# Patient Record
Sex: MALE | Race: WHITE
[De-identification: names, ages, dates, MRNs, and addresses within clinical notes are randomized per-mention and may not be internally consistent; named-entity substitution may affect disease eponyms.]

---

## 2017-07-25 ENCOUNTER — HOSPITAL ENCOUNTER (INPATIENT)
Dept: HOSPITAL 53 - M ED | Age: 37
LOS: 20 days | Discharge: HOME | DRG: 751 | End: 2017-08-14
Attending: PSYCHIATRY & NEUROLOGY | Admitting: PSYCHIATRY & NEUROLOGY
Payer: SELF-PAY

## 2017-07-25 VITALS — BODY MASS INDEX: 27.69 KG/M2 | HEIGHT: 75 IN | WEIGHT: 222.67 LBS

## 2017-07-25 VITALS — SYSTOLIC BLOOD PRESSURE: 140 MMHG | DIASTOLIC BLOOD PRESSURE: 92 MMHG

## 2017-07-25 DIAGNOSIS — R45.851: ICD-10-CM

## 2017-07-25 DIAGNOSIS — F19.90: ICD-10-CM

## 2017-07-25 DIAGNOSIS — F10.10: ICD-10-CM

## 2017-07-25 DIAGNOSIS — F33.3: Primary | ICD-10-CM

## 2017-07-25 DIAGNOSIS — E87.1: ICD-10-CM

## 2017-07-25 DIAGNOSIS — F41.0: ICD-10-CM

## 2017-07-25 DIAGNOSIS — Z79.899: ICD-10-CM

## 2017-07-25 DIAGNOSIS — F17.210: ICD-10-CM

## 2017-07-25 DIAGNOSIS — F12.10: ICD-10-CM

## 2017-07-25 DIAGNOSIS — F42.9: ICD-10-CM

## 2017-07-25 DIAGNOSIS — D72.829: ICD-10-CM

## 2017-07-25 LAB
ALBUMIN SERPL BCG-MCNC: 4.5 GM/DL (ref 3.2–5.2)
ALBUMIN/GLOB SERPL: 1.25 {RATIO} (ref 1–1.93)
ALP SERPL-CCNC: 74 U/L (ref 45–117)
ALT SERPL W P-5'-P-CCNC: 45 U/L (ref 12–78)
ANION GAP SERPL CALC-SCNC: 11 MEQ/L (ref 8–16)
AST SERPL-CCNC: 26 U/L (ref 15–37)
BILIRUB CONJ SERPL-MCNC: 0.2 MG/DL (ref 0–0.2)
BILIRUB SERPL-MCNC: 0.6 MG/DL (ref 0.2–1)
BUN SERPL-MCNC: 9 MG/DL (ref 7–18)
CALCIUM SERPL-MCNC: 10.1 MG/DL (ref 8.5–10.1)
CHLORIDE SERPL-SCNC: 98 MEQ/L (ref 98–107)
CO2 SERPL-SCNC: 23 MEQ/L (ref 21–32)
CREAT SERPL-MCNC: 1.13 MG/DL (ref 0.7–1.3)
ERYTHROCYTE [DISTWIDTH] IN BLOOD BY AUTOMATED COUNT: 12.6 % (ref 11.5–14.5)
GFR SERPL CREATININE-BSD FRML MDRD: > 60 ML/MIN/{1.73_M2} (ref 60–?)
GLUCOSE SERPL-MCNC: 100 MG/DL (ref 70–105)
MCH RBC QN AUTO: 31.3 PG (ref 27–33)
MCHC RBC AUTO-ENTMCNC: 34.6 G/DL (ref 32–36.5)
MCV RBC AUTO: 90.3 FL (ref 80–96)
METHADONE UR QL SCN: NEGATIVE
PLATELET # BLD AUTO: 349 K/MM3 (ref 150–450)
POTASSIUM SERPL-SCNC: 3.5 MEQ/L (ref 3.5–5.1)
PROT SERPL-MCNC: 8.1 GM/DL (ref 6.4–8.2)
SODIUM SERPL-SCNC: 132 MEQ/L (ref 136–145)
WBC # BLD AUTO: 11.9 K/MM3 (ref 4–10)

## 2017-07-26 VITALS — DIASTOLIC BLOOD PRESSURE: 77 MMHG | SYSTOLIC BLOOD PRESSURE: 131 MMHG

## 2017-07-26 RX ADMIN — OLANZAPINE PRN MG: 5 TABLET, ORALLY DISINTEGRATING ORAL at 08:50

## 2017-07-26 RX ADMIN — ACETAMINOPHEN PRN MG: 325 TABLET ORAL at 08:48

## 2017-07-26 RX ADMIN — NICOTINE SCH PATCH: 21 PATCH, EXTENDED RELEASE TRANSDERMAL at 08:47

## 2017-07-26 RX ADMIN — OLANZAPINE PRN MG: 5 TABLET, ORALLY DISINTEGRATING ORAL at 01:10

## 2017-07-26 NOTE — HPEPDOC
Medical History and Physical


Date of Admission


2017 at 22:09





History and Physical





PCP: None


ATTENDING: Dr. Markell Oliver





HPI: 36 yo M admitted to [facility] for unspecified depressive disorder, being 

medically examined today. No acute medical complaints today. Denies any fevers, 

chills, weakness, fatigue, HA, CP, SOB, cough, palpitations, abdominal pain, N/V

/D or changes in bowel or bladder habits.





PMHx: 


Anxiety


Depression


OCD


ODD


Insomnia


PTSD


Poor dentition





PSHX:


Denies





SOCHX:


Resides in: Homeless


Marital Status: Single


Kids: None


Employment: Unemployed


Tobacco use: 2 packs per day


ETOH: A few times per week 3-6 drinks


Illicit Drugs: Marijuana 1-2 times per week


IV Drug Use: Denies


Tattoos done unprofessionally: Denies 





FAMHX:


Mother: Alive, well 


Father: , cancer


Siblings: One sister Alive, well


Children: None


Unexpected deaths due to medical reasons: None.





ROS:


As noted in HPI, otherwise 11pt ROS of systems reviewed and remarkable only for 

poor dentition however the patient states he has not been having any dental 

pain recently.


                 


PE:      


GEN: 36 yo M, appears stated age. Well-nourished, well developed. No acute 

distress. Alert and oriented x 3. Avoids eye contact, slow to answer questions, 

mumbling. 


HEENT: Normocephalic, atraumatic. Pupils are equal, round, and reactive to 

light. Extraocular movements are intact. No nystagmus appreciated. Sclera are 

nonicteric. Conjunctiva without injection. Nose midline. Nasal turbinates 

without bogginess. EACs both patent BL. TMs both visualized and gray with good 

cone of light, no bulging or erythema. No facial asymmetry. Moist mucous 

membranes. Dentition fair. Pharynx pink and moist, no cobblestoning. Neck supple

, trachea midline. No lymphadenopathy or thyromegaly appreciated. 


CHEST: Regular rate and rhythm, +S1, +S2


LUNGS: Clear to auscultation bilaterally. No wheezes, rales, or rhonchi. 

Breathing appears symmetric and easy. Patient is speaking in full sentences. No 

accessory muscle use.


ABD: Round, soft, non-tender, non-distended. +Bowel sounds throughout. No 

rebound or guarding. No costovertebral angle tenderness.


EXT: Pulses 2+ bilaterally dorsalis pedis and radial. No lower extremity edema 

appreciated.


SKIN: Pink, dry, warm. Capillary refill <2sec. No rashes.


NEURO: Alert and oriented x 3. Cranial nerves III-XII are intact. No focal 

deficits appreciated. 





EKG: Pending








A&P: 36 yo M admitted to [facility] for unspecified depressive disorder


1. Psych. Plan per Psychiatry. Obtain baseline EKG to assure the safety of 

psychiatric medications as they can prolong the QT interval.


2. Nicotine dependence. Patch available.


3. Poor dentition. No issues currently. Tylenol if needed. Outpatient follow-up 

with dental provider.


4. Follow up. No Primary Care Provider. Will attempt to establish PCP on 

discharge.


5. Substance use. Per psychiatry. 


6. Mild leukocytosis. Patient is afebrile. Asymptomatic. Recheck CBC in a.m.


7. Hyponatremia. Patient reports poor by mouth intake prior to admission, 

states he is eating and drinking. Recheck CMP in a.m.


8. Staff member Kwesi present throughout exam.





Vital Signs





Vital Signs








  Date Time  Temp Pulse Resp B/P (MAP) Pulse Ox O2 Delivery O2 Flow Rate FiO2


 


17 23:11 97.8 68  140/92 (108)  Room Air  


 


17 22:56   16  95   











Laboratory Data


Labs 24H


Laboratory Tests 2


17 20:09: 


Anion Gap 11, Glomerular Filtration Rate > 60.0, Calcium Level 10.1, Aspartate 

Amino Transf (AST/SGOT) 26, Alanine Aminotransferase (ALT/SGPT) 45, Alkaline 

Phosphatase 74, Total Bilirubin 0.6, Direct Bilirubin 0.2, Total Protein 8.1, 

Albumin 4.5, Albumin/Globulin Ratio 1.25, Thyroid Stimulating Hormone (TSH) 

1.310, Salicylates Level 2.9L, Acetaminophen Level < 2.0L, Ethyl Alcohol Level 

< 0.003


17 20:26: 


Urine Amphetamines Screen NEGATIVE, Urine Benzodiazepines Screen NEGATIVE, 

Urine Opiates Screen NEGATIVE, Urine Methadone Screen NEGATIVE, Urine 

Barbiturates Screen NEGATIVE, Urine Phencyclidine Screen NEGATIVE, Urine 

Cocaine Metabolite Screen NEGATIVE, Urine Cannabinoids Screen POSITIVEH


CBC/BMP


Laboratory Tests


17 20:09








Red Blood Count 5.13, Mean Corpuscular Volume 90.3, Mean Corpuscular Hemoglobin 

31.3, Mean Corpuscular Hemoglobin Concent 34.6, Red Cell Distribution Width 12.6





Home Medications


Scheduled PRN


Acetaminophen (Tylenol Extra Strength) 500 Mg Tab, 1,000 MG PO Q6H PRN for PAIN


Calcium Carbonate (Tums E-X 750) 750 Mg Chw, 1,500 MG PO Q6H PRN for HEARTBURN





Allergies


Coded Allergies:  


     No Known Allergies (Unverified , 17)











Bharati Reese 2017 10:29

## 2017-07-27 VITALS — DIASTOLIC BLOOD PRESSURE: 77 MMHG | SYSTOLIC BLOOD PRESSURE: 120 MMHG

## 2017-07-27 VITALS — DIASTOLIC BLOOD PRESSURE: 86 MMHG | SYSTOLIC BLOOD PRESSURE: 157 MMHG

## 2017-07-27 VITALS — DIASTOLIC BLOOD PRESSURE: 73 MMHG | SYSTOLIC BLOOD PRESSURE: 129 MMHG

## 2017-07-27 LAB
ALBUMIN SERPL BCG-MCNC: 4 GM/DL (ref 3.2–5.2)
ALBUMIN/GLOB SERPL: 1.21 {RATIO} (ref 1–1.93)
ALP SERPL-CCNC: 61 U/L (ref 45–117)
ALT SERPL W P-5'-P-CCNC: 41 U/L (ref 12–78)
ANION GAP SERPL CALC-SCNC: 8 MEQ/L (ref 8–16)
AST SERPL-CCNC: 19 U/L (ref 15–37)
BILIRUB SERPL-MCNC: 0.6 MG/DL (ref 0.2–1)
BUN SERPL-MCNC: 16 MG/DL (ref 7–18)
CALCIUM SERPL-MCNC: 9.2 MG/DL (ref 8.5–10.1)
CHLORIDE SERPL-SCNC: 103 MEQ/L (ref 98–107)
CO2 SERPL-SCNC: 25 MEQ/L (ref 21–32)
CREAT SERPL-MCNC: 1.21 MG/DL (ref 0.7–1.3)
ERYTHROCYTE [DISTWIDTH] IN BLOOD BY AUTOMATED COUNT: 12.2 % (ref 11.5–14.5)
GFR SERPL CREATININE-BSD FRML MDRD: > 60 ML/MIN/{1.73_M2} (ref 60–?)
GLUCOSE SERPL-MCNC: 102 MG/DL (ref 70–105)
MCH RBC QN AUTO: 32 PG (ref 27–33)
MCHC RBC AUTO-ENTMCNC: 35.3 G/DL (ref 32–36.5)
MCV RBC AUTO: 90.4 FL (ref 80–96)
PLATELET # BLD AUTO: 313 K/MM3 (ref 150–450)
POTASSIUM SERPL-SCNC: 4 MEQ/L (ref 3.5–5.1)
PROT SERPL-MCNC: 7.3 GM/DL (ref 6.4–8.2)
SODIUM SERPL-SCNC: 136 MEQ/L (ref 136–145)
WBC # BLD AUTO: 7.8 K/MM3 (ref 4–10)

## 2017-07-27 RX ADMIN — OLANZAPINE PRN MG: 5 TABLET, ORALLY DISINTEGRATING ORAL at 08:45

## 2017-07-27 RX ADMIN — NICOTINE SCH PATCH: 21 PATCH, EXTENDED RELEASE TRANSDERMAL at 08:45
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
## 2017-07-28 VITALS — SYSTOLIC BLOOD PRESSURE: 138 MMHG | DIASTOLIC BLOOD PRESSURE: 92 MMHG

## 2017-07-28 VITALS — DIASTOLIC BLOOD PRESSURE: 63 MMHG | SYSTOLIC BLOOD PRESSURE: 124 MMHG

## 2017-07-28 VITALS — DIASTOLIC BLOOD PRESSURE: 70 MMHG | SYSTOLIC BLOOD PRESSURE: 130 MMHG

## 2017-07-28 RX ADMIN — ACETAMINOPHEN PRN MG: 325 TABLET ORAL at 10:08

## 2017-07-28 RX ADMIN — NICOTINE SCH PATCH: 21 PATCH, EXTENDED RELEASE TRANSDERMAL at 08:02

## 2017-07-28 RX ADMIN — OLANZAPINE PRN MG: 5 TABLET, ORALLY DISINTEGRATING ORAL at 08:23
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
## 2017-07-29 VITALS — DIASTOLIC BLOOD PRESSURE: 90 MMHG | SYSTOLIC BLOOD PRESSURE: 135 MMHG

## 2017-07-29 VITALS — SYSTOLIC BLOOD PRESSURE: 137 MMHG | DIASTOLIC BLOOD PRESSURE: 87 MMHG

## 2017-07-29 VITALS — SYSTOLIC BLOOD PRESSURE: 137 MMHG | DIASTOLIC BLOOD PRESSURE: 72 MMHG

## 2017-07-29 RX ADMIN — OLANZAPINE PRN MG: 5 TABLET, ORALLY DISINTEGRATING ORAL at 08:59

## 2017-07-29 RX ADMIN — ACETAMINOPHEN PRN MG: 325 TABLET ORAL at 18:51

## 2017-07-29 RX ADMIN — OLANZAPINE PRN MG: 5 TABLET, ORALLY DISINTEGRATING ORAL at 16:17

## 2017-07-29 RX ADMIN — NICOTINE SCH PATCH: 21 PATCH, EXTENDED RELEASE TRANSDERMAL at 08:58

## 2017-07-29 NOTE — IPN
DATE:  07/28/2017

 

VITAL SIGNS: Temperature 97.0, pulse 65, respirations 18, blood pressure 124/63.

 

CURRENT MEDICATIONS:

- Seroquel 200 mg at bedtime

- Prozac 20 mg every morning

- trazodone 50 mg at bedtime as needed

- Zyprexa 5 mg every 4 hours as needed

 

HISTORY OF PRESENT ILLNESS: Patient fell asleep faster with the Seroquel. He did

not stay asleep. He woke up fairly often. He was disrupted by the 15-minute

checks. He did not feel comfortable having his door open. He would keep getting

up to close it. He does like to take the Zyprexa first thing in the morning,

which seems to help with his anxiety and perhaps his paranoia. His appetite is

good. He is eating well here. His concentration is poor, however. He still feels

moderately depressed. He is afraid to go to groups. He complains of rowdy peers.

He appears paranoid of others. He is encouraged to be more socially engaged. He

spends a lot of time in his bedroom isolating. Patient has a single room but may

have to be switched to a double. Obsessive-compulsive disorder (OCD) symptoms are

mild.

 

MENTAL STATUS EXAMINATION: Patient is alert and oriented. He remains anxiety and

phobic. He reports OCD symptoms. Affect is sad. Mood is moderately depressed.

Patient does appear paranoid but denies hearing voices. Insight and judgment

remain fair.

 

DIAGNOSES:

1.  Major depression, recurrent, moderate severity.

2.  Delusional disorder, persecutory type.

3.  Obsessive-compulsive disorder.

4.  Panic/anxiety disorder.

5.  Alcohol use disorder.

6.  Cannabis use disorder.

 

PLAN: Increase Seroquel to 300 mg at bedtime. Continue use of Zyprexa as needed.

Staff to work on housing issues.

## 2017-07-30 VITALS — DIASTOLIC BLOOD PRESSURE: 82 MMHG | SYSTOLIC BLOOD PRESSURE: 143 MMHG

## 2017-07-30 VITALS — SYSTOLIC BLOOD PRESSURE: 136 MMHG | DIASTOLIC BLOOD PRESSURE: 84 MMHG

## 2017-07-30 VITALS — DIASTOLIC BLOOD PRESSURE: 85 MMHG | SYSTOLIC BLOOD PRESSURE: 140 MMHG

## 2017-07-30 VITALS — SYSTOLIC BLOOD PRESSURE: 140 MMHG | DIASTOLIC BLOOD PRESSURE: 82 MMHG

## 2017-07-30 RX ADMIN — NICOTINE SCH PATCH: 21 PATCH, EXTENDED RELEASE TRANSDERMAL at 07:47

## 2017-07-30 RX ADMIN — OLANZAPINE PRN MG: 5 TABLET, ORALLY DISINTEGRATING ORAL at 17:11

## 2017-07-30 RX ADMIN — ACETAMINOPHEN PRN MG: 325 TABLET ORAL at 17:11

## 2017-07-30 RX ADMIN — OLANZAPINE PRN MG: 5 TABLET, ORALLY DISINTEGRATING ORAL at 07:45

## 2017-07-31 VITALS — DIASTOLIC BLOOD PRESSURE: 80 MMHG | SYSTOLIC BLOOD PRESSURE: 138 MMHG

## 2017-07-31 VITALS — DIASTOLIC BLOOD PRESSURE: 81 MMHG | SYSTOLIC BLOOD PRESSURE: 154 MMHG

## 2017-07-31 VITALS — DIASTOLIC BLOOD PRESSURE: 92 MMHG | SYSTOLIC BLOOD PRESSURE: 142 MMHG

## 2017-07-31 VITALS — SYSTOLIC BLOOD PRESSURE: 122 MMHG | DIASTOLIC BLOOD PRESSURE: 78 MMHG

## 2017-07-31 RX ADMIN — OLANZAPINE PRN MG: 5 TABLET, ORALLY DISINTEGRATING ORAL at 07:39

## 2017-07-31 RX ADMIN — Medication SCH DOSE: at 20:47

## 2017-07-31 RX ADMIN — Medication SCH DOSE: at 14:32

## 2017-07-31 RX ADMIN — NICOTINE SCH PATCH: 21 PATCH, EXTENDED RELEASE TRANSDERMAL at 07:40

## 2017-07-31 NOTE — MHIPN
DATE:  07/31/2017

 

VITAL SIGNS:  Temperature 97.3, pulse 95, respirations 16, blood pressure

154/81.

 

CURRENT MEDICATIONS:

- Prozac 20 mg every morning

- trazodone 50 mg nightly as needed

- Seroquel 300 mg nightly

- Zyprexa 5 mg every 4 hours as needed

 

HISTORY OF PRESENT ILLNESS:  Patient's room was moved, he is tolerating the

roommate well.  He likes the Zyprexa, it seems to help with the anxiety and the

paranoia.  It seems to be helping his mood as well.  He feels less depressed.  He

is having a rare good day.  His appetite is fine.  He sleeps well at night with

the Seroquel.  He has been getting out of his room more often and attending some

groups.  He is contemplating leaving [place] and moving to [place] where

his mother lives.  This would get him away from the paranoid conspiracy here

regarding Mr. Gomez.  The patient got some information from a group that helps

with independent living here in the [place] area.

 

MENTAL STATUS EXAMINATION:  Patient is alert and oriented.  He still appears

anxious and worried.  Obsessive compulsive disorder (OCD) symptoms are mild.

Mood is mildly to moderately depressed.  The patient still reports some paranoia.

He denies hearing voices.  Denying current suicidal thoughts.  Insight and

judgment remain fair.

 

DIAGNOSES:

1.  Major depression, recurrent, moderate severity.

2.  Delusional disorder, persecutory type.

3.  Obsessive compulsive disorder (OCD).

4.  Panic anxiety disorder.

5.  Alcohol use disorder.

6.  Cannabis use disorder.

 

PLAN:  Patient to be given a standing dose of Zyprexa 5 mg twice a day.
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
## 2017-08-01 VITALS — SYSTOLIC BLOOD PRESSURE: 127 MMHG | DIASTOLIC BLOOD PRESSURE: 78 MMHG

## 2017-08-01 VITALS — DIASTOLIC BLOOD PRESSURE: 96 MMHG | SYSTOLIC BLOOD PRESSURE: 142 MMHG

## 2017-08-01 RX ADMIN — NICOTINE SCH PATCH: 21 PATCH, EXTENDED RELEASE TRANSDERMAL at 08:01

## 2017-08-01 RX ADMIN — ACETAMINOPHEN PRN MG: 325 TABLET ORAL at 18:59

## 2017-08-01 RX ADMIN — Medication SCH DOSE: at 08:01

## 2017-08-01 RX ADMIN — Medication SCH DOSE: at 20:57

## 2017-08-01 RX ADMIN — OLANZAPINE PRN MG: 5 TABLET, ORALLY DISINTEGRATING ORAL at 12:59
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
## 2017-08-02 VITALS — DIASTOLIC BLOOD PRESSURE: 90 MMHG | SYSTOLIC BLOOD PRESSURE: 146 MMHG

## 2017-08-02 VITALS — SYSTOLIC BLOOD PRESSURE: 151 MMHG | DIASTOLIC BLOOD PRESSURE: 89 MMHG

## 2017-08-02 RX ADMIN — OLANZAPINE PRN MG: 5 TABLET, ORALLY DISINTEGRATING ORAL at 13:28

## 2017-08-02 RX ADMIN — NICOTINE SCH PATCH: 21 PATCH, EXTENDED RELEASE TRANSDERMAL at 08:52

## 2017-08-02 RX ADMIN — Medication SCH DOSE: at 08:53

## 2017-08-02 RX ADMIN — Medication SCH DOSE: at 20:15

## 2017-08-03 VITALS — DIASTOLIC BLOOD PRESSURE: 71 MMHG | SYSTOLIC BLOOD PRESSURE: 128 MMHG

## 2017-08-03 VITALS — DIASTOLIC BLOOD PRESSURE: 89 MMHG | SYSTOLIC BLOOD PRESSURE: 142 MMHG

## 2017-08-03 RX ADMIN — Medication SCH DOSE: at 08:20

## 2017-08-03 RX ADMIN — ACETAMINOPHEN PRN MG: 325 TABLET ORAL at 09:58

## 2017-08-03 RX ADMIN — NICOTINE SCH PATCH: 21 PATCH, EXTENDED RELEASE TRANSDERMAL at 08:21

## 2017-08-03 RX ADMIN — OLANZAPINE PRN MG: 5 TABLET, ORALLY DISINTEGRATING ORAL at 16:49

## 2017-08-03 RX ADMIN — Medication SCH DOSE: at 20:37

## 2017-08-03 NOTE — IPN
DATE:  08/02/2017

 

Temperature 97.0, pulse 79, respirations 18, blood pressure 146/90.

 

CURRENT MEDICATION:

- Prozac 40 mg every morning

- Zyprexa 5 mg twice a day

- Seroquel 300 mg at bedtime (hs)

- trazodone 50 mg hs as needed

- Zyprexa 5 mg every 4 hours as needed

 

HISTORY OF PRESENT ILLNESS:  The patient is starting a higher dose of Prozac this

morning.  He has not received it yet.  Yesterday was not as good a day as the day

before.  He did feel more anxious and dysphoric yesterday.  He did push himself,

however, to go to the groups.  Depression was in a moderate range.  Obsessive

compulsive disorder (OCD) symptoms were mild.  He does like to wash his hands

constantly.  He is concerned and phobic about germs.  He does like the Zyprexa;

it helps with anxiety, but also paranoia.  He appears to have social phobic

symptoms, but also paranoid ideation.  He is afraid people might be poisoning him

for example.  He does admit that this temper is like a "time bomb."  He states

that his sister also has a bad temper and can be quite violent when intoxicated.

The patient is currently homeless.  He has no health insurance.  Staff has been

in touch with his mother.  One possible option would to be discharge to her care

in [place].  Otherwise, he might need to go to local shelter.

 

MENTAL STATUS EXAMINATION:  The patient is alert and oriented.  He is

cooperative.  He is still quite anxious and worried.  Depression is in the

moderate range.  He is not current suicidal.  he patient is still paranoid, but

denies hearing voices.  No signs of thought disorder.  Insight and judgment are

fair.

 

DIAGNOSIS:

1.  Major depression recurrent, moderate in severity.

2.  Delusional disorder, persecutory type.

3.  Obsessive compulsive disorder (OCD).

4.  Panic anxiety disorder.

5.  Alcohol use disorder.

6.  Cannabis use disorder.

 

PLAN:  Continue current psychotropics.  Staff working on various housing options.

Prozac dose to be increased today.  The patient to be monitored for any potential

side effects.

## 2017-08-04 VITALS — DIASTOLIC BLOOD PRESSURE: 77 MMHG | SYSTOLIC BLOOD PRESSURE: 150 MMHG

## 2017-08-04 VITALS — DIASTOLIC BLOOD PRESSURE: 87 MMHG | SYSTOLIC BLOOD PRESSURE: 136 MMHG

## 2017-08-04 RX ADMIN — ACETAMINOPHEN PRN MG: 325 TABLET ORAL at 20:37

## 2017-08-04 RX ADMIN — Medication SCH DOSE: at 21:09

## 2017-08-04 RX ADMIN — Medication SCH DOSE: at 09:04

## 2017-08-04 RX ADMIN — ACETAMINOPHEN PRN MG: 325 TABLET ORAL at 09:32

## 2017-08-04 RX ADMIN — NICOTINE SCH PATCH: 21 PATCH, EXTENDED RELEASE TRANSDERMAL at 09:03

## 2017-08-04 RX ADMIN — OLANZAPINE PRN MG: 5 TABLET, ORALLY DISINTEGRATING ORAL at 15:45

## 2017-08-04 NOTE — MHIPN
DATE:  08/03/2017

 

VITAL SIGNS:

Temperature 97.9, pulse 69, respirations 16, blood pressure 128/71.

 

CURRENT MEDICATIONS:

- Prozac 40 mg every morning

- Zyprexa 5 mg twice a day

- Seroquel 300 mg at bedtime

- trazodone 50 mg at bedtime as needed

 

HISTORY OF PRESENT ILLNESS:

The patient tolerated the 40 mg dose of Prozac well yesterday. He has no

gastrointestinal side effects. He thinks his anxiety is less prominent. He still

has a lot of social anxiety, however. He is quite phobic. He does have to push

himself to go to the groups. The Zyprexa does seem to have helped with the

paranoia. For example, in the past he has felt that people were spying on him

and/or trying to poison his food. His appetite has been fine on the unit. He is

sleeping well at night with the psychotropics, but did have a nightmare last

night which may have been due to his wearing the nicotine patch. He is 
requesting

help with his Social Security application from an agency in the community. He is

interested in going into transitional living services ([facility]) now.

 

MENTAL STATUS EXAMINATION:

The patient is alert, oriented and cooperative. The patient is chronically

anxious and worried. Depression is in the moderate range. The patient is not

currently suicidal. Paranoia persists. No signs of thought disorder. Insight and

judgment are fair.

 

DIAGNOSES:

1. Major depression, recurrent, moderate severity.

2. Delusional disorder, persecutory type.

3. Obsessive compulsive disorder.

4. Panic anxiety disorder.

5. Alcohol use disorder.

6. Cannabis use disorder.

 

PLAN:

No change in psychotropics. Patient to remove his nicotine patch before 
bedtime. staff

to work on [facility] housing options upon discharge.

ELVIRA

## 2017-08-05 VITALS — DIASTOLIC BLOOD PRESSURE: 92 MMHG | SYSTOLIC BLOOD PRESSURE: 138 MMHG

## 2017-08-05 VITALS — DIASTOLIC BLOOD PRESSURE: 81 MMHG | SYSTOLIC BLOOD PRESSURE: 126 MMHG

## 2017-08-05 RX ADMIN — Medication SCH DOSE: at 08:03

## 2017-08-05 RX ADMIN — Medication SCH DOSE: at 20:08

## 2017-08-05 RX ADMIN — ACETAMINOPHEN PRN MG: 325 TABLET ORAL at 21:22

## 2017-08-05 RX ADMIN — NICOTINE SCH PATCH: 21 PATCH, EXTENDED RELEASE TRANSDERMAL at 08:03

## 2017-08-05 NOTE — MHIPN
DATE:  08/04/2017

 

VITAL SIGNS:  Temperature 99.5, pulse 82, respirations 16, blood pressure 150/77.

 

 

CURRENT MEDICATION:

-  Prozac 40 mg every morning

-  Zyprexa 5 mg twice a day

-  Seroquel 300 mg at bedtime

-  trazodone 50 mg at bedtime as needed

 

HISTORY OF PRESENT ILLNESS:  The patient complains about his roommate situation.

His roommate has been very sleepy and spending most of the day in bed.  The

patient does not feel comfortable staying in his room with the roommate.  He

feels uncomfortable about seeming noisy.  The patient is on the unit.  The

patient is still anxious about the future.  He feels mildly to moderately

depressed.  He is not suicidal.  Paranoia fluctuates.  He denies hearing voices.

He is tolerating a higher dose of Prozac well.  He is agreeable to [facility]

Living Services ([facility]) referral for housing.  The patient will be switched to

voluntary status.

 

MENTAL STATUS EXAMINATION:  The patient is alert, oriented and cooperative.  The

patient has chronic anxiety and worry.  Depression is still in the moderate

range, but he is not currently suicidal.  He is not homicidal.  He does have

chronic paranoia.  He denies hearing voices.  Insight and judgment remain fair.

 

DIAGNOSES:  Major depression, recurrent, moderate severity.

Delusional disorder, persecutory type.

Obsessive compulsive disorder.

Panic and anxiety disorder.

Alcohol use disorder.

Cannabis use disorder.

 

PLAN:  No change in medication and milieu.  The patient will be switched to

voluntary basis over the weekend.

## 2017-08-06 VITALS — DIASTOLIC BLOOD PRESSURE: 97 MMHG | SYSTOLIC BLOOD PRESSURE: 145 MMHG

## 2017-08-06 VITALS — DIASTOLIC BLOOD PRESSURE: 78 MMHG | SYSTOLIC BLOOD PRESSURE: 133 MMHG

## 2017-08-06 RX ADMIN — NICOTINE SCH PATCH: 21 PATCH, EXTENDED RELEASE TRANSDERMAL at 08:09

## 2017-08-06 RX ADMIN — ACETAMINOPHEN PRN MG: 325 TABLET ORAL at 21:13

## 2017-08-06 RX ADMIN — OLANZAPINE PRN MG: 5 TABLET, ORALLY DISINTEGRATING ORAL at 14:05

## 2017-08-06 RX ADMIN — Medication SCH DOSE: at 20:11

## 2017-08-06 RX ADMIN — Medication SCH DOSE: at 08:10

## 2017-08-07 VITALS — SYSTOLIC BLOOD PRESSURE: 127 MMHG | DIASTOLIC BLOOD PRESSURE: 69 MMHG

## 2017-08-07 VITALS — SYSTOLIC BLOOD PRESSURE: 119 MMHG | DIASTOLIC BLOOD PRESSURE: 68 MMHG

## 2017-08-07 RX ADMIN — NICOTINE SCH PATCH: 21 PATCH, EXTENDED RELEASE TRANSDERMAL at 08:23

## 2017-08-07 RX ADMIN — Medication SCH DOSE: at 20:19

## 2017-08-07 RX ADMIN — ACETAMINOPHEN PRN MG: 325 TABLET ORAL at 18:15

## 2017-08-07 RX ADMIN — Medication SCH DOSE: at 08:24

## 2017-08-07 NOTE — MHIPN
DATE:  08/07/2017

 

VITAL SIGNS:  Temperature 97.9, pulse 69, respirations 18, blood pressure 127/69.

 

 

CURRENT MEDICATIONS:

- Prozac 40 mg in the morning

- Zyprexa 5 mg twice a day

- Seroquel 300 mg at night

- trazodone 50 mg at night

- Zyprexa 5 mg every 4 hours as needed

 

HISTORY OF PRESENT ILLNESS:   The patient feels comfortable with his roommate,

who is relatively quiet.  He is still paranoid, however.  He believes that his

girlfriend was cheating him prior to admission. He requests HIV testing.  The

patient meets with [facility] ([facility]) housing today for

assistance upon discharge.  The patient has found the Prozac helpful. His anxiety

is not as severe in group situation.  He still feels mildly to moderately

depressed.

 

MENTAL STATUS EXAMINATION:

The patient is alert, oriented and cooperative.  The patient has chronic anxiety.

He has chronic worry.  Depression is in the moderate range.  He is not voicing

any suicidal thoughts here on the unit.  He is not homicidal either.  Paranoia

persists, but he denies hearing voices.  Insight and judgment remain fair.

 

DIAGNOSES:

1.  Major depression, recurrent, moderate severity.

2.  Delusional disorder, persecutory type.

3.  Obsessive compulsive disorder.

4.  Panic/anxiety disorder.

5.  Alcohol use disorder.

6.  Cannabis use disorder.

 

Change to voluntary status.  Continue psychotropics and milieu therapy.

## 2017-08-08 VITALS — DIASTOLIC BLOOD PRESSURE: 77 MMHG | SYSTOLIC BLOOD PRESSURE: 134 MMHG

## 2017-08-08 VITALS — DIASTOLIC BLOOD PRESSURE: 81 MMHG | SYSTOLIC BLOOD PRESSURE: 138 MMHG

## 2017-08-08 RX ADMIN — Medication SCH DOSE: at 20:33

## 2017-08-08 RX ADMIN — Medication SCH DOSE: at 08:16

## 2017-08-08 RX ADMIN — NICOTINE SCH PATCH: 21 PATCH, EXTENDED RELEASE TRANSDERMAL at 08:16

## 2017-08-08 RX ADMIN — OLANZAPINE PRN MG: 5 TABLET, ORALLY DISINTEGRATING ORAL at 14:25

## 2017-08-08 RX ADMIN — ACETAMINOPHEN PRN MG: 325 TABLET ORAL at 19:20

## 2017-08-08 NOTE — IPN
DATE:   08/08/2017

 

VITAL SIGNS:  Temperature 97.9, pulse 71, respirations 16, blood pressure 134/77.

 

 

CURRENT MEDICATIONS:

- Prozac 40 mg in the morning

- Zyprexa 5 mg twice a day

- Seroquel 300 mg at night

- trazodone 50 mg at night

 

HISTORY OF PRESENT ILLNESS:   The patient remains anxious about his roommate.

His new roommate is relatively quiet.  He does get quite anxious in social

situations, like groups.  His depression is chronic, it is in moderate range.  He

did meet with [facility] housing support staff.  He is on

their waiting list, but it might take some weeks before he gets housing.  There

is another program that might be available to help with housing as well.  The

patient is on two different antipsychotics.  Risk and benefit profile reviewed.

Lipid profile is ordered to monitor cholesterol, triglycerides, etc.  The patient

is still paranoid. He feels safe here on the unit, but he feels that there is a

"target" on his head out in the community.  He feels that people are looking for

him to kill him.  The patient was switched to voluntary status yesterday.

 

MENTAL STATUS EXAMINATION:

The patient is awake, alert and cooperative.  The patient has chronic anxiety and

chronic depression, both are in the moderate range.  He has no suicidal thoughts.

He is not  homicidal.  The patient still reports paranoia.  He denies hearing

voices.  No signs of thought disorder.  Insight and judgment are fair.  No signs

of memory deficits.

 

DIAGNOSES:

1.  Major depression, recurrent, moderate severity.

2.  Delusional disorder, persecutory type.

3.  Obsessive compulsive disorder.

4.  Panic/anxiety disorder.

5.  Alcohol use disorder.

6.  Cannabis use disorder.

 

PLAN:  Obtain lipid profile, as the patient is on two different antipsychotics.

Staff are working on housing options for discharge.

## 2017-08-09 VITALS — SYSTOLIC BLOOD PRESSURE: 134 MMHG | DIASTOLIC BLOOD PRESSURE: 89 MMHG

## 2017-08-09 VITALS — DIASTOLIC BLOOD PRESSURE: 78 MMHG | SYSTOLIC BLOOD PRESSURE: 143 MMHG

## 2017-08-09 LAB
CHOLEST SERPL-MCNC: 174 MG/DL (ref ?–200)
TRIGL SERPL-MCNC: 147 MG/DL (ref ?–150)

## 2017-08-09 RX ADMIN — NICOTINE SCH PATCH: 21 PATCH, EXTENDED RELEASE TRANSDERMAL at 08:05

## 2017-08-09 RX ADMIN — Medication SCH DOSE: at 08:05

## 2017-08-09 RX ADMIN — Medication SCH DOSE: at 20:11

## 2017-08-09 NOTE — MHIPN
DATE:    08/09/2017

 

VITAL SIGNS:  Temperature 99.2, pulse 72, respirations 16, blood pressure 143/78.

 

 

CURRENT MEDICATIONS:

- Prozac 40 mg in the morning

- Zyprexa 5 mg twice a day

- Seroquel 300 mg at night

- trazodone 50 mg at night as needed

 

HISTORY OF PRESENT ILLNESS:   The patient has chronic anxiety and depression,

both mild to moderate.  He feels psychotropics have been somewhat helpful. He

remains paranoid, especially about walking in the community.  He is afraid that

he will be targeted with guns and killed.  He will have a social work agency who

is willing to assist him with transportation, etc. upon discharge.  The patient

is agreeable to emergency housing, as no apartments are currently available.  The

patient's lipid profile is pending.  His HIV test was negative.

 

MENTAL STATUS EXAMINATION:  The patient is alert, oriented and cooperative.  No

change in chronic anxiety.  The patient has mild to moderate depression.  No

current suicidal thoughts.  He is not homicidal.  Paranoia is chronic.  The

patient denies hearing voices.  Insight and judgment are fair.

 

DIAGNOSES:

1.  Major depression, recurrent, moderate in severity.

2.  Delusional disorder, persecutory type.

3.  Obsessive compulsive disorder (OCD).

4.  Panic/anxiety disorder.

5.  Alcohol use disorder.

6.  Cannabis use disorder.

 

Continue present management.  Involve with hospital milieu.  Staff working on

discharge planning.

## 2017-08-10 VITALS — DIASTOLIC BLOOD PRESSURE: 82 MMHG | SYSTOLIC BLOOD PRESSURE: 147 MMHG

## 2017-08-10 VITALS — SYSTOLIC BLOOD PRESSURE: 125 MMHG | DIASTOLIC BLOOD PRESSURE: 68 MMHG

## 2017-08-10 RX ADMIN — OLANZAPINE PRN MG: 5 TABLET, ORALLY DISINTEGRATING ORAL at 13:41

## 2017-08-10 RX ADMIN — Medication SCH DOSE: at 08:11

## 2017-08-10 RX ADMIN — Medication SCH DOSE: at 20:36

## 2017-08-10 RX ADMIN — NICOTINE SCH PATCH: 21 PATCH, EXTENDED RELEASE TRANSDERMAL at 08:12

## 2017-08-10 NOTE — MHIPN
DATE:   08/10/2017

 

 

VITAL SIGNS:  Temperature 98.0, pulse 78, respirations 18, blood pressure 125/68.

 

 

CURRENT MEDICATIONS:

- Prozac 40 mg in the morning

- Zyprexa 5 mg twice a day

- Seroquel 300 mg at night

- Zyprexa 5 mg every 4 hours as needed

 

HISTORY OF PRESENT ILLNESS:   The patient still reports chronic anxiety.

Depression is mild to moderate range.  He worries about the future.  He is

resigned to the fact that he will need to go to emergency housing, as there are

no apartments available at this time.  The patient is more engaged with community

based resources. He does feel reasonably optimistic.  His mood has been

relatively stable here on the unit.  No temper outbursts.  He is attending

groups.  Appetite is good.  He is sleeping well. Lipid profile is available.

Triglycerides are within normal limits at 147.  Total cholesterol within normal

limits of 174.  The patient's LDL cholesterol however is elevated at 106.6.  His

HDL cholesterol is low at 38.

 

MENTAL STATUS EXAMINATION:

The patient is alert and oriented.  Anxiety is moderate.  Depression and mild to

moderate.  The patient is not suicidal.  The patient has chronic paranoia but

denies hearing voices.  Insight and judgment are fair.  No current signs of

dangerousness.

 

DIAGNOSES:

1.  Major depression, recurrent with psychotic features.

2.  Obsessive compulsive disorder.

3.  Panic and anxiety disorder.

4.  Alcohol use disorder.

5.  Cannabis use disorder.

 

PLAN:  Continue present management.  Tentative discharge date early next week.

## 2017-08-11 VITALS — SYSTOLIC BLOOD PRESSURE: 152 MMHG | DIASTOLIC BLOOD PRESSURE: 85 MMHG

## 2017-08-11 VITALS — DIASTOLIC BLOOD PRESSURE: 79 MMHG | SYSTOLIC BLOOD PRESSURE: 142 MMHG

## 2017-08-11 RX ADMIN — Medication SCH DOSE: at 20:15

## 2017-08-11 RX ADMIN — Medication SCH DOSE: at 08:35

## 2017-08-11 RX ADMIN — NICOTINE SCH PATCH: 21 PATCH, EXTENDED RELEASE TRANSDERMAL at 08:36

## 2017-08-11 NOTE — MHIPN
DATE:  08/11/2017

 

VITAL SIGNS:

Temperature 97.5, pulse 75, respirations 19, blood pressure 142/79.

 

CURRENT MEDICATIONS:

- Prozac 40 mg every morning

- Zyprexa 5 mg twice a day

- Seroquel 300 mg at bedtime

- trazodone 50 mg at bedtime as needed

 

HISTORY OF PRESENT ILLNESS:

The patient is still reporting anxiety symptoms. He has chronic depression in the

moderate range. He denies any major mood swings; however, he is grieving the loss

of his girlfriend. The patient describes his history of avoidance. He would

isolate at home and she would take care of him in a way that clearly was not in

his best interests. The patient has been recently cooperative and active in the

hospital milieu.

 

MENTAL STATUS EXAMINATION:

The patient is alert and oriented. He is cooperative. Anxiety and depression are

in mild to moderate range. No signs of dangerousness. He is not suicidal or

homicidal. The patient does have chronic paranoia but is not hearing voices.

Insight and judgment are fair.

 

DIAGNOSES:

1. Major depression, recurrent with psychotic features.

2. Obsessive compulsive disorder.

3. Panic anxiety disorder.

4. Alcohol use disorder.

5. Cannabis use disorder.

 

PLAN:

No change in medications. Plans for discharge on Monday with outpatient mental

health followup.

## 2017-08-12 VITALS — DIASTOLIC BLOOD PRESSURE: 76 MMHG | SYSTOLIC BLOOD PRESSURE: 128 MMHG

## 2017-08-12 VITALS — DIASTOLIC BLOOD PRESSURE: 76 MMHG | SYSTOLIC BLOOD PRESSURE: 141 MMHG

## 2017-08-12 RX ADMIN — ACETAMINOPHEN PRN MG: 325 TABLET ORAL at 18:01

## 2017-08-12 RX ADMIN — Medication SCH DOSE: at 08:23

## 2017-08-12 RX ADMIN — Medication SCH DOSE: at 20:29

## 2017-08-12 RX ADMIN — NICOTINE SCH PATCH: 21 PATCH, EXTENDED RELEASE TRANSDERMAL at 08:22

## 2017-08-13 VITALS — DIASTOLIC BLOOD PRESSURE: 63 MMHG | SYSTOLIC BLOOD PRESSURE: 117 MMHG

## 2017-08-13 VITALS — DIASTOLIC BLOOD PRESSURE: 79 MMHG | SYSTOLIC BLOOD PRESSURE: 133 MMHG

## 2017-08-13 RX ADMIN — OLANZAPINE PRN MG: 5 TABLET, ORALLY DISINTEGRATING ORAL at 18:24

## 2017-08-13 RX ADMIN — ACETAMINOPHEN PRN MG: 325 TABLET ORAL at 20:52

## 2017-08-13 RX ADMIN — SALINE NASAL SPRAY PRN SPRAY: 1.5 SOLUTION NASAL at 11:25

## 2017-08-13 RX ADMIN — Medication SCH DOSE: at 08:32

## 2017-08-13 RX ADMIN — SALINE NASAL SPRAY PRN SPRAY: 1.5 SOLUTION NASAL at 18:24

## 2017-08-13 RX ADMIN — Medication SCH DOSE: at 20:50

## 2017-08-13 RX ADMIN — NICOTINE SCH PATCH: 21 PATCH, EXTENDED RELEASE TRANSDERMAL at 08:32

## 2017-08-14 VITALS — SYSTOLIC BLOOD PRESSURE: 128 MMHG | DIASTOLIC BLOOD PRESSURE: 71 MMHG

## 2017-08-14 RX ADMIN — NICOTINE SCH PATCH: 21 PATCH, EXTENDED RELEASE TRANSDERMAL at 08:27

## 2017-08-14 RX ADMIN — Medication SCH DOSE: at 08:27
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
## 2017-10-11 ENCOUNTER — HOSPITAL ENCOUNTER (OUTPATIENT)
Dept: HOSPITAL 53 - M OUTALCOH | Age: 37
End: 2017-10-11
Attending: PSYCHIATRY & NEUROLOGY
Payer: SELF-PAY

## 2017-10-11 DIAGNOSIS — F12.10: Primary | ICD-10-CM

## 2017-10-11 PROCEDURE — 80307 DRUG TEST PRSMV CHEM ANLYZR: CPT

## 2017-10-16 LAB — THC UR CFM-MCNC: 174 NG/ML

## 2017-12-18 ENCOUNTER — HOSPITAL ENCOUNTER (OUTPATIENT)
Dept: HOSPITAL 53 - M LAB | Age: 37
End: 2017-12-18
Attending: FAMILY MEDICINE
Payer: COMMERCIAL

## 2017-12-18 DIAGNOSIS — D64.9: Primary | ICD-10-CM

## 2017-12-18 LAB
ALBUMIN SERPL BCG-MCNC: 4.1 GM/DL (ref 3.2–5.2)
ALBUMIN/GLOB SERPL: 1.17 {RATIO} (ref 1–1.93)
ALP SERPL-CCNC: 82 U/L (ref 45–117)
ALT SERPL W P-5'-P-CCNC: 31 U/L (ref 12–78)
ANION GAP SERPL CALC-SCNC: 11 MEQ/L (ref 8–16)
AST SERPL-CCNC: 19 U/L (ref 7–37)
BILIRUB SERPL-MCNC: 0.6 MG/DL (ref 0.2–1)
BUN SERPL-MCNC: 7 MG/DL (ref 7–18)
CALCIUM SERPL-MCNC: 9.4 MG/DL (ref 8.5–10.1)
CHLORIDE SERPL-SCNC: 102 MEQ/L (ref 98–107)
CHOLEST SERPL-MCNC: 171 MG/DL (ref ?–200)
CO2 SERPL-SCNC: 25 MEQ/L (ref 21–32)
CREAT SERPL-MCNC: 1.19 MG/DL (ref 0.7–1.3)
ERYTHROCYTE [DISTWIDTH] IN BLOOD BY AUTOMATED COUNT: 12.3 % (ref 11.5–14.5)
GFR SERPL CREATININE-BSD FRML MDRD: > 60 ML/MIN/{1.73_M2} (ref 60–?)
GLUCOSE SERPL-MCNC: 127 MG/DL (ref 70–105)
IRON SATN MFR SERPL: 28.4 % (ref 19.7–50)
MCH RBC QN AUTO: 31.5 PG (ref 27–33)
MCHC RBC AUTO-ENTMCNC: 34.4 G/DL (ref 32–36.5)
MCV RBC AUTO: 91.4 FL (ref 80–96)
NRBC BLD AUTO-RTO: 0 % (ref 0–0)
PLATELET # BLD AUTO: 322 10^3/UL (ref 150–450)
POTASSIUM SERPL-SCNC: 4.7 MEQ/L (ref 3.5–5.1)
PROT SERPL-MCNC: 7.6 GM/DL (ref 6.4–8.2)
SODIUM SERPL-SCNC: 138 MEQ/L (ref 136–145)
T4 SERPL-MCNC: 9.1 UG/DL (ref 4.5–12)
TIBC SERPL-MCNC: 380 UG/DL (ref 250–450)
TRIGL SERPL-MCNC: 91 MG/DL (ref ?–150)
WBC # BLD AUTO: 8 10^3/UL (ref 4–10)

## 2018-01-09 ENCOUNTER — HOSPITAL ENCOUNTER (OUTPATIENT)
Dept: HOSPITAL 53 - M RAD | Age: 38
End: 2018-01-09
Attending: FAMILY MEDICINE
Payer: COMMERCIAL

## 2018-01-09 DIAGNOSIS — M54.5: Primary | ICD-10-CM

## 2018-01-09 PROCEDURE — 72110 X-RAY EXAM L-2 SPINE 4/>VWS: CPT

## 2021-05-26 ENCOUNTER — HOSPITAL ENCOUNTER (OUTPATIENT)
Dept: HOSPITAL 53 - M LAB | Age: 41
End: 2021-05-26
Attending: FAMILY MEDICINE
Payer: COMMERCIAL

## 2021-05-26 DIAGNOSIS — E03.9: ICD-10-CM

## 2021-05-26 DIAGNOSIS — R53.83: ICD-10-CM

## 2021-05-26 DIAGNOSIS — D64.9: Primary | ICD-10-CM

## 2021-05-26 LAB
25(OH)D3 SERPL-MCNC: 27.3 NG/ML (ref 30–100)
ALBUMIN SERPL BCG-MCNC: 4 GM/DL (ref 3.2–5.2)
ALT SERPL W P-5'-P-CCNC: 28 U/L (ref 12–78)
BILIRUB SERPL-MCNC: 0.3 MG/DL (ref 0.2–1)
BUN SERPL-MCNC: 12 MG/DL (ref 7–18)
CALCIUM SERPL-MCNC: 9.8 MG/DL (ref 8.5–10.1)
CHLORIDE SERPL-SCNC: 106 MEQ/L (ref 98–107)
CHOLEST SERPL-MCNC: 196 MG/DL (ref ?–200)
CHOLEST/HDLC SERPL: 7 {RATIO} (ref ?–5)
CO2 SERPL-SCNC: 28 MEQ/L (ref 21–32)
CREAT SERPL-MCNC: 1.3 MG/DL (ref 0.7–1.3)
GFR SERPL CREATININE-BSD FRML MDRD: > 60 ML/MIN/{1.73_M2} (ref 60–?)
GLUCOSE SERPL-MCNC: 116 MG/DL (ref 70–100)
HCT VFR BLD AUTO: 44.3 % (ref 42–52)
HDLC SERPL-MCNC: 28 MG/DL (ref 40–?)
HGB BLD-MCNC: 15.3 G/DL (ref 13.5–17.5)
LDLC SERPL CALC-MCNC: 111 MG/DL (ref ?–100)
MCH RBC QN AUTO: 31.6 PG (ref 27–33)
MCHC RBC AUTO-ENTMCNC: 34.5 G/DL (ref 32–36.5)
MCV RBC AUTO: 91.5 FL (ref 80–96)
NONHDLC SERPL-MCNC: 168 MG/DL
PLATELET # BLD AUTO: 373 10^3/UL (ref 150–450)
POTASSIUM SERPL-SCNC: 3.7 MEQ/L (ref 3.5–5.1)
PROT SERPL-MCNC: 7.3 GM/DL (ref 6.4–8.2)
RBC # BLD AUTO: 4.84 10^6/UL (ref 4.3–6.1)
SODIUM SERPL-SCNC: 143 MEQ/L (ref 136–145)
TRIGL SERPL-MCNC: 286 MG/DL (ref ?–150)
TSH SERPL DL<=0.005 MIU/L-ACNC: 1.94 UIU/ML (ref 0.36–3.74)
WBC # BLD AUTO: 10.2 10^3/UL (ref 4–10)

## 2022-03-17 ENCOUNTER — HOSPITAL ENCOUNTER (OUTPATIENT)
Dept: HOSPITAL 53 - M RAD | Age: 42
End: 2022-03-17
Attending: FAMILY MEDICINE
Payer: COMMERCIAL

## 2022-03-17 DIAGNOSIS — J44.9: ICD-10-CM

## 2022-03-17 DIAGNOSIS — R53.83: Primary | ICD-10-CM

## 2022-03-17 LAB
ALBUMIN SERPL BCG-MCNC: 4.4 GM/DL (ref 3.2–5.2)
ALT SERPL W P-5'-P-CCNC: 25 U/L (ref 12–78)
BILIRUB SERPL-MCNC: 0.4 MG/DL (ref 0.2–1)
BUN SERPL-MCNC: 8 MG/DL (ref 7–18)
CALCIUM SERPL-MCNC: 9.9 MG/DL (ref 8.5–10.1)
CHLORIDE SERPL-SCNC: 104 MEQ/L (ref 98–107)
CHOLEST SERPL-MCNC: 169 MG/DL (ref ?–200)
CHOLEST/HDLC SERPL: 5.28 {RATIO} (ref ?–5)
CO2 SERPL-SCNC: 27 MEQ/L (ref 21–32)
CREAT SERPL-MCNC: 1.17 MG/DL (ref 0.7–1.3)
EST. AVERAGE GLUCOSE BLD GHB EST-MCNC: 97 MG/DL (ref 60–110)
GFR SERPL CREATININE-BSD FRML MDRD: > 60 ML/MIN/{1.73_M2} (ref 60–?)
GLUCOSE SERPL-MCNC: 105 MG/DL (ref 70–100)
HCT VFR BLD AUTO: 40.5 % (ref 42–52)
HDLC SERPL-MCNC: 32 MG/DL (ref 40–?)
HGB BLD-MCNC: 14.4 G/DL (ref 13.5–17.5)
LDLC SERPL CALC-MCNC: 115 MG/DL (ref ?–100)
MCH RBC QN AUTO: 31.3 PG (ref 27–33)
MCHC RBC AUTO-ENTMCNC: 35.6 G/DL (ref 32–36.5)
MCV RBC AUTO: 88 FL (ref 80–96)
NONHDLC SERPL-MCNC: 137 MG/DL
PLATELET # BLD AUTO: 354 10^3/UL (ref 150–450)
POTASSIUM SERPL-SCNC: 3.8 MEQ/L (ref 3.5–5.1)
PROT SERPL-MCNC: 7.2 GM/DL (ref 6.4–8.2)
RBC # BLD AUTO: 4.6 10^6/UL (ref 4.3–6.1)
SODIUM SERPL-SCNC: 137 MEQ/L (ref 136–145)
TRIGL SERPL-MCNC: 111 MG/DL (ref ?–150)
TSH SERPL DL<=0.005 MIU/L-ACNC: 0.84 UIU/ML (ref 0.36–3.74)
WBC # BLD AUTO: 11.7 10^3/UL (ref 4–10)